# Patient Record
Sex: FEMALE
[De-identification: names, ages, dates, MRNs, and addresses within clinical notes are randomized per-mention and may not be internally consistent; named-entity substitution may affect disease eponyms.]

---

## 2020-03-05 ENCOUNTER — HOSPITAL ENCOUNTER (EMERGENCY)
Dept: HOSPITAL 5 - ED | Age: 55
Discharge: OTHER INSTITUTION | End: 2020-03-05
Payer: SELF-PAY

## 2020-03-05 VITALS — DIASTOLIC BLOOD PRESSURE: 92 MMHG | SYSTOLIC BLOOD PRESSURE: 153 MMHG

## 2020-03-05 DIAGNOSIS — Z91.041: ICD-10-CM

## 2020-03-05 DIAGNOSIS — Z88.6: ICD-10-CM

## 2020-03-05 DIAGNOSIS — I10: ICD-10-CM

## 2020-03-05 DIAGNOSIS — R51: ICD-10-CM

## 2020-03-05 DIAGNOSIS — T79.7XXA: Primary | ICD-10-CM

## 2020-03-05 DIAGNOSIS — M79.89: ICD-10-CM

## 2020-03-05 DIAGNOSIS — Z88.8: ICD-10-CM

## 2020-03-05 LAB
ALBUMIN SERPL-MCNC: 4.5 G/DL (ref 3.9–5)
ALT SERPL-CCNC: 54 UNITS/L (ref 7–56)
BUN SERPL-MCNC: 11 MG/DL (ref 7–17)
BUN/CREAT SERPL: 16 %
CALCIUM SERPL-MCNC: 9.9 MG/DL (ref 8.4–10.2)
HCT VFR BLD CALC: 44.5 % (ref 30.3–42.9)
HEMOLYSIS INDEX: 7
HGB BLD-MCNC: 14.9 GM/DL (ref 10.1–14.3)
MCHC RBC AUTO-ENTMCNC: 34 % (ref 30–34)
MCV RBC AUTO: 92 FL (ref 79–97)
PLATELET # BLD: 309 K/MM3 (ref 140–440)
RBC # BLD AUTO: 4.82 M/MM3 (ref 3.65–5.03)

## 2020-03-05 PROCEDURE — 80053 COMPREHEN METABOLIC PANEL: CPT

## 2020-03-05 PROCEDURE — 70486 CT MAXILLOFACIAL W/O DYE: CPT

## 2020-03-05 PROCEDURE — 99285 EMERGENCY DEPT VISIT HI MDM: CPT

## 2020-03-05 PROCEDURE — 96376 TX/PRO/DX INJ SAME DRUG ADON: CPT

## 2020-03-05 PROCEDURE — 96374 THER/PROPH/DIAG INJ IV PUSH: CPT

## 2020-03-05 PROCEDURE — 36415 COLL VENOUS BLD VENIPUNCTURE: CPT

## 2020-03-05 PROCEDURE — 96375 TX/PRO/DX INJ NEW DRUG ADDON: CPT

## 2020-03-05 PROCEDURE — 85027 COMPLETE CBC AUTOMATED: CPT

## 2020-03-05 NOTE — CAT SCAN REPORT
CT of the facial bones



INDICATION: Facial pain and swelling



FINDINGS: The sinuses are clear. There is no fracture seen. There is extensive subcutaneous air invol
ving the facial area and neck. The etiology is uncertain as again no definite sinus fracture is seen 
with the sinuses clear. Air extends into the orbits and skull base as well. I cannot identify any def
inite fluid collection or abscess. There is no air seen in the upper chest.



IMPRESSION: Extensive facial subcutaneous air without definite source identified. There is no definit
e abscess or cellulitis.



All CT scans at this location are performed using CT dose reduction for ALARA by means of automated e
xposure control



Signer Name: Markos Samson MD 

Signed: 3/5/2020 1:50 AM

Workstation Name: [id number]

## 2020-03-05 NOTE — EMERGENCY DEPARTMENT REPORT
ED General Adult HPI





- General


Chief complaint: Pain General


Stated complaint: FACIAL PAIN AND SWELLING


Time Seen by Provider: 03/05/20 00:34


Source: EMS


Mode of arrival: Ambulatory


Limitations: No Limitations





- History of Present Illness


Initial comments: 





Patient is a 54-year-old female that presents emergency room with complaints of 

facial swelling.  Patient states that she is also having facial pain.  Patient 

states it started 5 hours ago.  Patient denies difficulty swallowing.  Patient 

denies throat pain.  Patient denies shortness of breath.  Patient denies fever 

chills.  Patient states the left face is worse than the right base.  Patient 

states that the left facial pain is worse than the right facial pain.  Patient 

states that the left facial area is more sensitive to touch.  Patient denies 

chest pain.  Patient denies fever and chills.  Patient denies nausea and 

vomiting.  Patient denies changes in her vision.  Patient denies pain with 

movement of her eyes.  Patient denies trauma.  Patient denies dental pain.  

Patient states she sees a dentist regularly.








-: Sudden


Location: face, left, right


Radiation: non-radiation


Severity scale (0 -10): 10


Quality: stabbing


Consistency: constant


Improves with: rest


Worsens with: movement, other (paplation)


Associated Symptoms: denies: confusion, chest pain, cough, diaphoresis, 

fever/chills, headaches, loss of appetite, malaise, nausea/vomiting, rash, 

seizure, shortness of breath, syncope, weakness


Treatments Prior to Arrival: none





- Related Data


                                    Allergies











Allergy/AdvReac Type Severity Reaction Status Date / Time


 


erythromycin base Allergy  Unknown Verified 03/05/20 01:16


 


iodine Allergy  Hives Verified 03/05/20 01:16


 


NSAIDS (Non-Steroidal Allergy  Hives Verified 03/05/20 01:16





Anti-Inflamma     














ED Review of Systems


ROS: 


Stated complaint: FACIAL PAIN AND SWELLING


Other details as noted in HPI





Comment: All other systems reviewed and negative


Constitutional: denies: chills, fever


Eyes: denies: eye pain, eye discharge, vision change


ENT: denies: ear pain, throat pain


Respiratory: denies: cough, shortness of breath, wheezing


Cardiovascular: denies: chest pain, palpitations


Endocrine: no symptoms reported


Gastrointestinal: denies: abdominal pain, nausea, diarrhea


Genitourinary: denies: urgency, dysuria, discharge


Musculoskeletal: denies: back pain, joint swelling, arthralgia


Skin: denies: rash, lesions


Neurological: denies: headache, weakness, paresthesias


Psychiatric: denies: anxiety, depression


Hematological/Lymphatic: denies: easy bleeding, easy bruising





ED Past Medical Hx





- Past Medical History


Previous Medical History?: Yes


Hx Hypertension: Yes


Additional medical history: heart murmur





- Surgical History


Past Surgical History?: No





- Family History


Family history: no significant





- Social History


Smoking Status: Never Smoker


Substance Use Type: None





ED Physical Exam





- General


Limitations: No Limitations


General appearance: alert, in no apparent distress





- Head


Head exam: Present: atraumatic, normocephalic





- Eye


Eye exam: Present: normal appearance, PERRL, EOMI, periorbital swelling, 

periorbital tenderness


Pupils: Present: normal accommodation





- ENT


ENT exam: Present: mucous membranes moist, other (Patient swelling noted.  

Bilateral bilateral periorbital swelling and tenderness.)





- Neck


Neck exam: Present: normal inspection





- Respiratory


Respiratory exam: Present: normal lung sounds bilaterally.  Absent: respiratory 

distress, wheezes, rales





- Cardiovascular


Cardiovascular Exam: Present: regular rate, normal rhythm.  Absent: systolic 

murmur, diastolic murmur, rubs, gallop





- GI/Abdominal


GI/Abdominal exam: Present: soft, normal bowel sounds





- Rectal


Rectal exam: Present: deferred





- Extremities Exam


Extremities exam: Present: normal inspection





- Back Exam


Back exam: Present: normal inspection





- Neurological Exam


Neurological exam: Present: alert, oriented X3





- Psychiatric


Psychiatric exam: Present: normal affect, normal mood





- Skin


Skin exam: Present: warm, dry, intact, normal color.  Absent: rash





ED Course


                                   Vital Signs











  03/05/20 03/05/20 03/05/20





  00:26 00:35 02:32


 


Temperature 99.0 F 97.7 F 


 


Pulse Rate 104 H 114 H 


 


Respiratory 16 20 18





Rate   


 


Blood Pressure 128/72  


 


Blood Pressure  122/102 





[Left]   


 


O2 Sat by Pulse 98 97 





Oximetry   














- Reevaluation(s)


Reevaluation #1: 


I discussed all results with patient.  I discussed plan of care with patient.  

Patient agrees with plan of care and transfer to [facility] ER..  Patient to 

be transported via EMS to [facility].


03/05/20 03:13





Reevaluation #2: 


Patient has requested to speak to me again and the patient still agrees with 

transfer but is having severe pain in her face.  Patient will be given 2 mg of 

Dilaudid.


03/05/20 03:17








- Consultations


Consultation #1: 


ENT will be consulted.  [facility] has been contacted.  [facility] has been contacted.


03/05/20 02:35








[facility] HAS NOT BEDS. 


03/05/20 02:42








I discussed case with [facility].  [facility] recommends transfer to [facility].  

The ER at [facility] has accepted the patient agrees to the transfer as well.

  The accepting physician is Dr. TABARES


03/05/20 03:03











ED Medical Decision Making





- Lab Data


Result diagrams: 


                                 03/05/20 00:47





                                 03/05/20 01:46





- Radiology Data


Radiology results: report reviewed








 CT of the facial bones 





INDICATION: Facial pain and swelling 





FINDINGS: The sinuses are clear. There is no fracture seen. There is extensive 

subcutaneous air 


 involving the facial area and neck. The etiology is uncertain as again no 

definite sinus fracture is


 seen with the sinuses clear. Air extends into the orbits and skull base as 

well. I cannot identify 


 any definite fluid collection or abscess. There is no air seen in the upper 

chest. 





IMPRESSION: Extensive facial subcutaneous air without definite source 

identified. There is no 


 definite abscess or cellulitis. 





- Medical Decision Making








Patient is a 54-year-old female that presents emergency room with complaints of 

bilateral facial swelling and pain.  Patient had a CT scan of the facial bones 

and orbits and it shows extensive subcutaneous air throughout the face, orbits 

and extending to the base of the skull and the anterior neck.  Patient's labs 

are unremarkable.  Patient does have a fever, patient does not have a white 

count.  We do not have ENT services at this hospital and I contacted the 

transfer centers of the local facilities.  I discussed the case fully with ENT 

at [facility].  Patient has been accepted to transfer ER to ER to [facility] for further evaluation treatment.  While in the ER the patient had 

multiple complaints of pain.  Patient initially was given Solu-Medrol for the 

swelling and it decreased slightly, the patient still have pain and patient was 

given a total of 3 mg of Dilaudid.  Patient then became nauseous and was given 

Zofran.





 


 





- Differential Diagnosis


Periorbital cellulitis, sinus infection, facial pain, facial swelling


Critical Care Time: Yes


Critical care time in (mins) excluding proc time.: 55


Critical care attestation.: 


If time is entered above; I have spent that time in minutes in the direct care 

of this critically ill patient, excluding procedure time.





Critical Care Time: 





55 MINUTES





ED Disposition


Clinical Impression: 


 Facial pain, acute, Facial swelling, Orbital swelling





Subcutaneous air


Qualifiers:


 Encounter type: initial encounter Qualified Code(s): T79.7XXA - Traumatic 

subcutaneous emphysema, initial encounter





Disposition: [id number]/TX-70 ANOTHER TYPE HLTHCARE


Is pt being admited?: No


Does the pt Need Aspirin: No


Condition: Critical


Time of Disposition: 03:09